# Patient Record
Sex: MALE | Employment: UNEMPLOYED
[De-identification: names, ages, dates, MRNs, and addresses within clinical notes are randomized per-mention and may not be internally consistent; named-entity substitution may affect disease eponyms.]

---
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
## 2021-09-27 ENCOUNTER — HOSPITAL ENCOUNTER (EMERGENCY)
Age: 21
Discharge: HOME | End: 2021-09-27
Attending: EMERGENCY MEDICINE
Payer: COMMERCIAL

## 2021-09-27 ENCOUNTER — APPOINTMENT (OUTPATIENT)
Dept: GENERAL RADIOLOGY | Age: 21
End: 2021-09-27
Payer: COMMERCIAL

## 2021-09-27 VITALS
HEART RATE: 68 BPM | WEIGHT: 150 LBS | HEIGHT: 73 IN | TEMPERATURE: 98.6 F | OXYGEN SATURATION: 98 % | RESPIRATION RATE: 16 BRPM | DIASTOLIC BLOOD PRESSURE: 90 MMHG | BODY MASS INDEX: 19.88 KG/M2 | SYSTOLIC BLOOD PRESSURE: 142 MMHG

## 2021-09-27 DIAGNOSIS — R07.9 CHEST PAIN, UNSPECIFIED TYPE: Primary | ICD-10-CM

## 2021-09-27 PROCEDURE — 93005 ELECTROCARDIOGRAM TRACING: CPT | Performed by: EMERGENCY MEDICINE

## 2021-09-27 PROCEDURE — 71045 X-RAY EXAM CHEST 1 VIEW: CPT

## 2021-09-27 PROCEDURE — 99282 EMERGENCY DEPT VISIT SF MDM: CPT

## 2021-09-27 ASSESSMENT — PAIN DESCRIPTION - FREQUENCY: FREQUENCY: INTERMITTENT

## 2021-09-27 ASSESSMENT — PAIN DESCRIPTION - DESCRIPTORS: DESCRIPTORS: ACHING

## 2021-09-27 ASSESSMENT — PAIN SCALES - GENERAL: PAINLEVEL_OUTOF10: 4

## 2021-09-27 ASSESSMENT — PAIN DESCRIPTION - LOCATION: LOCATION: CHEST

## 2021-09-28 LAB
EKG ATRIAL RATE: 72 BPM
EKG P AXIS: 74 DEGREES
EKG P-R INTERVAL: 124 MS
EKG Q-T INTERVAL: 382 MS
EKG QRS DURATION: 90 MS
EKG QTC CALCULATION (BAZETT): 418 MS
EKG R AXIS: 46 DEGREES
EKG T AXIS: 74 DEGREES
EKG VENTRICULAR RATE: 72 BPM

## 2021-09-28 NOTE — FLOWSHEET NOTE
Pt arrives ambulatory with c/o chest pain that started on sept 4 and has continued intermittently since then pain is exacerbated with deep breaths upon exam pt alert and in nad resp non labored skin warm and dry

## 2021-09-28 NOTE — ED PROVIDER NOTES
[address] [place] [address] ED  [facility]      Pt Name: Nany Cardona  MRN: [id number]  [place] 2000  Date ofevaluation: 2021  Provider: Mary Tamayo Dr       Chief Complaint   Patient presents with    Chest Pain     on and off since          HISTORY OF PRESENT ILLNESS  (Location/Symptom, Timing/Onset, Context/Setting, Quality, Duration, Modifying Factors, Severity.)   Nany Cardona is a 24 y.o. adult who presents to the emergency department with intermittent chest pain since . No current chest pain. Patient states the pain is worse when he breathes through his nose. No fevers or chills. No nausea vomiting. No cough. No definite alleviating factors. No other complaints. No diaphoresis. No recent travel history. Nursing Notes were reviewed. ALLERGIES     Patient has no known allergies. CURRENT MEDICATIONS       Previous Medications    No medications on file       PAST MEDICAL HISTORY   History reviewed. No pertinent past medical history. SURGICAL HISTORY           Procedure Laterality Date    WISDOM TOOTH EXTRACTION           FAMILY HISTORY           Problem Relation Age of Onset    No Known Problems Mother     Early Death Father     Heart Disease Father      Family Status   Relation Name Status    Mother  Alive    Father          SOCIAL HISTORY      reports that he has never smoked. He has never used smokeless tobacco. He reports that he does not drink alcohol and does not use drugs. REVIEW OFSYSTEMS    (2-9 systems for level 4, 10 or more for level 5)   Review of Systems    Except as noted above the remainder of the review of systems was reviewed and negative.      PHYSICAL EXAM    (up to 7 for level 4, 8 or more for level 5)     ED Triage Vitals [21]   BP Temp Temp Source Pulse Resp SpO2 Height Weight   (!) 142/90 98.6 °F (37 °C) Oral 68 16 98 % 6' 1\" (1.854 m) 150 lb (68 kg)      Physical Patient will be discharged home. Chest x-ray negative for any acute process. On EKG. CONSULTS:  None    PROCEDURES:  Procedures        FINAL IMPRESSION      1. Chest pain, unspecified type          DISPOSITION/PLAN   DISPOSITION Decision To Discharge 09/27/2021 11:23:49 PM      PATIENTREFERRED TO:   No follow-up provider specified.     DISCHARGE MEDICATIONS:     New Prescriptions    No medications on file           (Please note that portions of this note were completed with a voice recognition program.  Efforts were made to edit thedictations but occasionally words are mis-transcribed.)    Charmayne Roys, PA-C Charmayne Roys, PA-C  09/27/21 [address] [place] [address] MARANDA Chang  09/27/21 [id number]

## 2024-03-25 NOTE — ED PROVIDER NOTES
eMERGENCY dEPARTMENT eNCOUnter   Independent Attestation     Pt Name: Kaden Spears  MRN: [id number]  [place] 2000  Date of evaluation: 9/27/21     Kaden Spears is a 24 y.o. adult with CC: Chest Pain (on and off since 9/4)      Based on the medical record the care appears appropriate. I was personally available for consultation in the Emergency Department.     Inna Pena DO  Attending Emergency Physician                    Mary Soriano DO  09/27/21 [id number] Abdomen soft, non-tender and non-distended, no rebound, no guarding and no masses. no hepatosplenomegaly.